# Patient Record
(demographics unavailable — no encounter records)

---

## 2025-04-07 NOTE — HISTORY OF PRESENT ILLNESS
[FreeTextEntry1] : 63 y/o M w Hx of graves disease, DM2 HLD here for initial evaluation and management of diabetes generally feels well and endorses no acute complaints. reports diagnosis ~ 2 y/a, has always been on diet control, reports good control over the years, denies micro vasc damage. no past insulin use. recent A1Cs have risen. denies sedentary lifestyle or worsening dietary indiscretions, avoids exces simple starch or ultraprocessed food. no soda/juice consumption. he otherwise denies any f/c, CP, SOB, palpitations, tremors, depressed mood, anxiety, palpitations, n/v, stool/urinary abn, skin/weight changes, heat/cold intolerance, HAs, breast/nipple changes, polyuria/polydipsia/nocturia or other complaints. he reprots diagnosis of graves disease 20 y/a,. has been om MTX ever since, dose usually ~50-10 mg daily. reports latest dose adjustment was reduction from 10 to 5 on 10/2024. last TFTs on 10/2024 showed TSH at 2, fT4 at 1.1. again denies any dysphagia, hoarseness, neck tenderness or new palpable masses. again denies any family history of thyroid disorders or personal exposure to ionizing radiation.

## 2025-04-07 NOTE — ASSESSMENT
[Long Term Vascular Complications] : long term vascular complications of diabetes [Carbohydrate Consistent Diet] : carbohydrate consistent diet [Importance of Diet and Exercise] : importance of diet and exercise to improve glycemic control, achieve weight loss and improve cardiovascular health [Methimazole Therapy/PTU Therapy] : Risks and benefits of methimazole therapy/PTU therapy were discussed with the patient, including rash, liver dysfunction, and agranulocytosis. Patient was instructed to call the office for flu-like symptoms (e.g. fever and sore-throat) [FreeTextEntry1] : 1) DM2: controlled, A1C on  target of <7%. Natural hx of the disease and importance of treatment targets discussed at length, he verbalized understanding. ADA diet and importance of exercise discussed at length. . We sabino check microalbumin, lipids and labs on the NV. Discuss vaccines and podiatry/opthalmology referrals on NV   2) Now appears euthyroid. WE discussed all options for reestablishing euthyroid state, including TT vs. NUNEZ. vs. long term MATHEW use, and their respective r/b. After discussion, he is interested on continued MTX. Verbalized understanding and agrees with treatment plan, will contact MD and seek emergency medical care if condition changes, we reviewed the sign/symptoms or worsening thyrotoxicosis and thyroid storm. cont 5 mg daily for now.   3) Dyslipidemia: Pt is  on a moderate intensity statin. Atorvastatin 40 mg QDaily. REassess lipids on the next visit. LDL target <100.